# Patient Record
Sex: FEMALE
[De-identification: names, ages, dates, MRNs, and addresses within clinical notes are randomized per-mention and may not be internally consistent; named-entity substitution may affect disease eponyms.]

---

## 2024-02-14 ENCOUNTER — APPOINTMENT (OUTPATIENT)
Dept: OBGYN | Facility: CLINIC | Age: 19
End: 2024-02-14
Payer: COMMERCIAL

## 2024-02-14 VITALS — HEIGHT: 65.75 IN | BODY MASS INDEX: 16.1 KG/M2 | WEIGHT: 99 LBS

## 2024-02-14 DIAGNOSIS — Z76.89 PERSONS ENCOUNTERING HEALTH SERVICES IN OTHER SPECIFIED CIRCUMSTANCES: ICD-10-CM

## 2024-02-14 PROBLEM — Z00.00 ENCOUNTER FOR PREVENTIVE HEALTH EXAMINATION: Status: ACTIVE | Noted: 2024-02-14

## 2024-02-14 PROCEDURE — 99202 OFFICE O/P NEW SF 15 MIN: CPT

## 2024-02-14 RX ORDER — NORETHINDRONE ACETATE 5 MG/1
5 TABLET ORAL
Qty: 60 | Refills: 1 | Status: ACTIVE | COMMUNITY
Start: 2024-02-14 | End: 1900-01-01

## 2024-02-14 NOTE — PLAN
[FreeTextEntry1] : discussed options to regulate menses pt is getting  05/27  will take aygestin starting 03/17 until 03/27 and then again in April and May to keep period starting on the 29 of the month also discussed contraceptive options and their side effects and their effectiveness pt will decide about the contraception aygestin prescribed pt to ret for visit 2 months after the wedding

## 2024-02-14 NOTE — HISTORY OF PRESENT ILLNESS
[FreeTextEntry1] : 19 yo  here to discuss how to regulate her cycle for her wedding and to discuss contraceptive options available to her after the wedding. no sig med hx on no meds NKDA menarche at age 11 cycles 28-29 day interval LMP 01/29

## 2024-05-06 DIAGNOSIS — N76.0 ACUTE VAGINITIS: ICD-10-CM

## 2024-05-06 RX ORDER — FLUCONAZOLE 150 MG/1
150 TABLET ORAL
Qty: 3 | Refills: 0 | Status: ACTIVE | COMMUNITY
Start: 2024-05-06 | End: 1900-01-01

## 2024-05-22 ENCOUNTER — APPOINTMENT (OUTPATIENT)
Dept: OBGYN | Facility: CLINIC | Age: 19
End: 2024-05-22
Payer: COMMERCIAL

## 2024-05-22 VITALS
DIASTOLIC BLOOD PRESSURE: 65 MMHG | SYSTOLIC BLOOD PRESSURE: 98 MMHG | BODY MASS INDEX: 17.99 KG/M2 | WEIGHT: 108 LBS | HEIGHT: 65 IN

## 2024-05-22 DIAGNOSIS — Z30.9 ENCOUNTER FOR CONTRACEPTIVE MANAGEMENT, UNSPECIFIED: ICD-10-CM

## 2024-05-22 DIAGNOSIS — Z30.09 ENCOUNTER FOR OTHER GENERAL COUNSELING AND ADVICE ON CONTRACEPTION: ICD-10-CM

## 2024-05-22 DIAGNOSIS — Z01.419 ENCOUNTER FOR GYNECOLOGICAL EXAMINATION (GENERAL) (ROUTINE) W/OUT ABNORMAL FINDINGS: ICD-10-CM

## 2024-05-22 LAB
BILIRUB UR QL STRIP: NORMAL
GLUCOSE UR-MCNC: NORMAL
HCG UR QL: 1 EU/DL
HCG UR QL: NEGATIVE
HGB UR QL STRIP.AUTO: NORMAL
KETONES UR-MCNC: NORMAL
LEUKOCYTE ESTERASE UR QL STRIP: NORMAL
NITRITE UR QL STRIP: NORMAL
PH UR STRIP: 7.5
PROT UR STRIP-MCNC: NORMAL
SP GR UR STRIP: 1.02

## 2024-05-22 PROCEDURE — 81025 URINE PREGNANCY TEST: CPT

## 2024-05-22 PROCEDURE — 99213 OFFICE O/P EST LOW 20 MIN: CPT

## 2024-05-22 PROCEDURE — 81003 URINALYSIS AUTO W/O SCOPE: CPT | Mod: QW

## 2024-05-22 RX ORDER — NORETHINDRONE ACETATE AND ETHINYL ESTRADIOL AND FERROUS FUMARATE 1MG-20(21)
1-20 KIT ORAL DAILY
Qty: 1 | Refills: 6 | Status: ACTIVE | COMMUNITY
Start: 2024-05-22 | End: 1900-01-01

## 2024-05-22 NOTE — HISTORY OF PRESENT ILLNESS
[FreeTextEntry1] : pt is currently taking aygestin to regulate her period prior to her wedding end of May pt wants to discuss her contraceptive options for the next yr. and when she should start this pt is not sexually active, will delay her annual exam until after the wedding

## 2024-05-22 NOTE — PLAN
[FreeTextEntry1] : discussed all options including OCPs Nuvaring and IUD pt wants to try a low dose OC to start on loestrin 1/20 on day 3 of her first period after the wedding and then to follow the pill pack instructions discussed risks and benefits of the pill ret in 3-4 months for annual exam

## 2024-08-20 ENCOUNTER — NON-APPOINTMENT (OUTPATIENT)
Age: 19
End: 2024-08-20

## 2024-08-21 ENCOUNTER — APPOINTMENT (OUTPATIENT)
Dept: OTOLARYNGOLOGY | Facility: CLINIC | Age: 19
End: 2024-08-21
Payer: COMMERCIAL

## 2024-08-21 DIAGNOSIS — Z87.09 PERSONAL HISTORY OF OTHER DISEASES OF THE RESPIRATORY SYSTEM: ICD-10-CM

## 2024-08-21 DIAGNOSIS — J31.0 CHRONIC RHINITIS: ICD-10-CM

## 2024-08-21 DIAGNOSIS — J01.90 ACUTE SINUSITIS, UNSPECIFIED: ICD-10-CM

## 2024-08-21 DIAGNOSIS — J32.0 CHRONIC MAXILLARY SINUSITIS: ICD-10-CM

## 2024-08-21 DIAGNOSIS — H61.20 IMPACTED CERUMEN, UNSPECIFIED EAR: ICD-10-CM

## 2024-08-21 DIAGNOSIS — Z78.9 OTHER SPECIFIED HEALTH STATUS: ICD-10-CM

## 2024-08-21 DIAGNOSIS — J01.91 ACUTE RECURRENT SINUSITIS, UNSPECIFIED: ICD-10-CM

## 2024-08-21 PROCEDURE — 31231 NASAL ENDOSCOPY DX: CPT

## 2024-08-21 RX ORDER — PREDNISONE 20 MG/1
20 TABLET ORAL
Qty: 10 | Refills: 0 | Status: ACTIVE | COMMUNITY
Start: 2024-08-21 | End: 1900-01-01

## 2024-08-21 RX ORDER — FLUTICASONE PROPIONATE 50 UG/1
50 SPRAY, METERED NASAL
Qty: 1 | Refills: 4 | Status: ACTIVE | COMMUNITY
Start: 2024-08-21 | End: 1900-01-01

## 2024-08-21 RX ORDER — CEFUROXIME AXETIL 250 MG/1
250 TABLET ORAL
Qty: 20 | Refills: 0 | Status: ACTIVE | COMMUNITY
Start: 2024-08-21 | End: 1900-01-01

## 2024-08-27 ENCOUNTER — APPOINTMENT (OUTPATIENT)
Dept: OBGYN | Facility: CLINIC | Age: 19
End: 2024-08-27

## 2024-08-27 VITALS — DIASTOLIC BLOOD PRESSURE: 74 MMHG | SYSTOLIC BLOOD PRESSURE: 112 MMHG | HEART RATE: 89 BPM | WEIGHT: 105 LBS

## 2024-08-27 DIAGNOSIS — Z01.419 ENCOUNTER FOR GYNECOLOGICAL EXAMINATION (GENERAL) (ROUTINE) W/OUT ABNORMAL FINDINGS: ICD-10-CM

## 2024-08-27 DIAGNOSIS — Z30.9 ENCOUNTER FOR CONTRACEPTIVE MANAGEMENT, UNSPECIFIED: ICD-10-CM

## 2024-08-27 LAB
BILIRUB UR QL STRIP: NORMAL
GLUCOSE UR-MCNC: NORMAL
HCG UR QL: 0.2 EU/DL
HCG UR QL: NEGATIVE
HGB UR QL STRIP.AUTO: NORMAL
KETONES UR-MCNC: NORMAL
LEUKOCYTE ESTERASE UR QL STRIP: NORMAL
NITRITE UR QL STRIP: NORMAL
PH UR STRIP: 7
PROT UR STRIP-MCNC: NORMAL
SP GR UR STRIP: 1.02

## 2024-08-27 PROCEDURE — 81003 URINALYSIS AUTO W/O SCOPE: CPT | Mod: QW

## 2024-08-27 PROCEDURE — 99395 PREV VISIT EST AGE 18-39: CPT

## 2024-08-27 PROCEDURE — 99459 PELVIC EXAMINATION: CPT | Mod: NC

## 2024-08-27 PROCEDURE — 81025 URINE PREGNANCY TEST: CPT

## 2024-08-27 RX ORDER — NORETHINDRONE ACETATE AND ETHINYL ESTRADIOL AND FERROUS FUMARATE 1MG-20(21)
1-20 KIT ORAL DAILY
Qty: 4 | Refills: 3 | Status: ACTIVE | COMMUNITY
Start: 2024-08-27 | End: 1900-01-01

## 2024-08-27 NOTE — HISTORY OF PRESENT ILLNESS
[FreeTextEntry1] : 17 yo   in June, on low dose OC good cycle control pt has noted a change in vaginal discharge since beginning the pill no vaginal itching or burning pt will be going to [place] for 5 months

## 2024-08-27 NOTE — PHYSICAL EXAM
[Chaperone Present] : A chaperone was present in the examining room during all aspects of the physical examination [97685] : A chaperone was present during the pelvic exam. [Soft] : soft [Non-tender] : non-tender [Non-distended] : non-distended [No Mass] : no mass [Oriented x3] : oriented x3 [Examination Of The Breasts] : a normal appearance [No Masses] : no breast masses were palpable [Labia Majora] : normal [Labia Minora] : normal [Normal] : normal [Uterine Adnexae] : normal

## 2024-08-27 NOTE — PLAN
[FreeTextEntry1] : normal WWE too young for Pap, next year Cx sent vaginal dicharge appears normal no signs of infection pt reassured  will continue on OCs n/v 1 yr or PRN

## 2024-08-28 LAB
C TRACH RRNA SPEC QL NAA+PROBE: NOT DETECTED
EAR NOSE AND THROAT CULTURE: ABNORMAL
N GONORRHOEA RRNA SPEC QL NAA+PROBE: NOT DETECTED
SOURCE AMPLIFICATION: NORMAL

## 2024-08-28 NOTE — ADDENDUM
[FreeTextEntry1] : 8/28/24   H flu-  resistant amox but sensitive to augmentin  ( on cefuroxime)   RLP

## 2024-08-28 NOTE — PHYSICAL EXAM
[FreeTextEntry1] : General: The patient was alert and oriented and in no distress. Voice was clear.  She had a completely hyponasal voice  Ears:  Procedure note:  Removal of Cerumen Impactions, both ears:  21429-44 Both external ears were normal. There were symptomatic cerumen impactions in both ears.  These were cleared microscopically without trauma using both suction and curettes.  After clearing,  both ear canals were clear both eardrums were intact and mobile.    verbal consent was obtained and patient felt improvement after procedure  Oral cavity: The oral mucosa was normal. The oral and base of tongue were clear and without mass. The gingival and buccal mucosa were moist and without lesions. The palate moved well. There was no cleft to the palate. There appeared to be good salivary flow.   There was no pus, erythema or mass in the oral cavity.  Neck:  The neck was symmetrical. The parotid and submandibular glands were normal without masses. The trachea was midline and there was no unusual crepitus. The thyroid was smooth and nontender and no masses were palpated. There was no significant cervical adenopathy.  Face: The patient had no facial asymmetry or mass. The skin was unremarkable.   Nasal endoscopy:  CPT 60429 Procedure Note:  Endoscopy was done with Covid precautions and with video. All risks and benefits were discussed with the patient and consent obtained.  Nasal endoscopy was done with topical anesthesia of Pontocaine and Afrin and a      nasal endoscope. Indication: Nasal congestion, rule out sinusitis. Procedure: The nasal cavity was anesthetized with topical Afrin and Pontocaine. An  endoscope was used and inserted into the nasal cavity. Attention was first paid to the anterior nasal cavity. Endocoscopy was performed to inspect the interior of the nasal cavity, the nasal septum,  the middle and superior meati, the inferior, middle and superior turbinates, and the spheno-ethmoidal  recesses, the nasopharynx and eustachian tube orifices bilaterally. including the nasal mocosa, the possibility of polyps and the consistency of the nasal mucous. All findings were normal except:   The nasal mucosa was markedly boggy and completely congested although I did not visualize polyps.  The nasal airway was filled with milky frothy discharge.  With a rigid 0 degree endoscope this was suctioned and cultured.  I was unable to get as far as the nasopharynx due to the significance of the congestion even after Afrin and Pontocaine

## 2024-08-28 NOTE — ASSESSMENT
[FreeTextEntry1] : It was my impression that the patient had a cerumen impaction that was cleared.  I recommended topical moisturizing.  I recommended avoiding Q-tips.  I reviewed aural hygiene and the role of cerumen with the patient.  I suggested a repeat visit in a year or earlier should the need arise.   She has had 5 years of persistent nasal congestion and recurrent infections treated multiple times a year with Augmentin with temporary and incomplete improvement.  She has a significant mucosal edema and thick frothy discharge that was cultured.  She has an ongoing sinusitis and recommended a course of cefuroxime pending the culture results.  Due to the extent of the congestion I recommended a 5-day course of prednisone-risks and benefits and twice daily fluticasone.  I suggested both an allergy evaluation and CT imaging and would like to see her back in follow-up afterwards

## 2024-08-28 NOTE — HISTORY OF PRESENT ILLNESS
[de-identified] :  BRYANT ESPAÑA was seen on August 21 for initial evaluation of recurrent sinusitis. This first started in Oct of 2019 after a cold that lasted 6 weeks. She was treated with Augmentin and Flonase and it had resolved. Since then she had been getting sick every few months - half a year and gets resolved with supportive measures or with Augmentin and Flonase. She has nasal congestion and breathes better through the right side. She was never tested for allergies but her mom reportedly told her she was allergic to dust. She denies facial pain.

## 2024-08-28 NOTE — PHYSICAL EXAM
[FreeTextEntry1] : General: The patient was alert and oriented and in no distress. Voice was clear.  She had a completely hyponasal voice  Ears:  Procedure note:  Removal of Cerumen Impactions, both ears:  07257-85 Both external ears were normal. There were symptomatic cerumen impactions in both ears.  These were cleared microscopically without trauma using both suction and curettes.  After clearing,  both ear canals were clear both eardrums were intact and mobile.    verbal consent was obtained and patient felt improvement after procedure  Oral cavity: The oral mucosa was normal. The oral and base of tongue were clear and without mass. The gingival and buccal mucosa were moist and without lesions. The palate moved well. There was no cleft to the palate. There appeared to be good salivary flow.   There was no pus, erythema or mass in the oral cavity.  Neck:  The neck was symmetrical. The parotid and submandibular glands were normal without masses. The trachea was midline and there was no unusual crepitus. The thyroid was smooth and nontender and no masses were palpated. There was no significant cervical adenopathy.  Face: The patient had no facial asymmetry or mass. The skin was unremarkable.   Nasal endoscopy:  CPT 14196 Procedure Note:  Endoscopy was done with Covid precautions and with video. All risks and benefits were discussed with the patient and consent obtained.  Nasal endoscopy was done with topical anesthesia of Pontocaine and Afrin and a      nasal endoscope. Indication: Nasal congestion, rule out sinusitis. Procedure: The nasal cavity was anesthetized with topical Afrin and Pontocaine. An  endoscope was used and inserted into the nasal cavity. Attention was first paid to the anterior nasal cavity. Endocoscopy was performed to inspect the interior of the nasal cavity, the nasal septum,  the middle and superior meati, the inferior, middle and superior turbinates, and the spheno-ethmoidal  recesses, the nasopharynx and eustachian tube orifices bilaterally. including the nasal mocosa, the possibility of polyps and the consistency of the nasal mucous. All findings were normal except:   The nasal mucosa was markedly boggy and completely congested although I did not visualize polyps.  The nasal airway was filled with milky frothy discharge.  With a rigid 0 degree endoscope this was suctioned and cultured.  I was unable to get as far as the nasopharynx due to the significance of the congestion even after Afrin and Pontocaine

## 2024-08-28 NOTE — HISTORY OF PRESENT ILLNESS
[de-identified] :  BRYANT ESPAÑA was seen on August 21 for initial evaluation of recurrent sinusitis. This first started in Oct of 2019 after a cold that lasted 6 weeks. She was treated with Augmentin and Flonase and it had resolved. Since then she had been getting sick every few months - half a year and gets resolved with supportive measures or with Augmentin and Flonase. She has nasal congestion and breathes better through the right side. She was never tested for allergies but her mom reportedly told her she was allergic to dust. She denies facial pain.

## 2024-09-05 ENCOUNTER — APPOINTMENT (OUTPATIENT)
Dept: CT IMAGING | Facility: CLINIC | Age: 19
End: 2024-09-05
Payer: COMMERCIAL

## 2024-09-05 ENCOUNTER — OUTPATIENT (OUTPATIENT)
Dept: OUTPATIENT SERVICES | Facility: HOSPITAL | Age: 19
LOS: 1 days | End: 2024-09-05

## 2024-09-05 PROCEDURE — 70486 CT MAXILLOFACIAL W/O DYE: CPT | Mod: 26
